# Patient Record
Sex: MALE | Race: WHITE | NOT HISPANIC OR LATINO | Employment: OTHER | URBAN - METROPOLITAN AREA
[De-identification: names, ages, dates, MRNs, and addresses within clinical notes are randomized per-mention and may not be internally consistent; named-entity substitution may affect disease eponyms.]

---

## 2018-10-19 ENCOUNTER — HOSPITAL ENCOUNTER (OUTPATIENT)
Facility: HOSPITAL | Age: 68
Setting detail: OBSERVATION
Discharge: HOME | End: 2018-10-20
Attending: EMERGENCY MEDICINE | Admitting: HOSPITALIST

## 2018-10-19 ENCOUNTER — APPOINTMENT (OUTPATIENT)
Dept: CARDIOLOGY | Facility: HOSPITAL | Age: 68
End: 2018-10-19
Attending: EMERGENCY MEDICINE

## 2018-10-19 ENCOUNTER — APPOINTMENT (OUTPATIENT)
Dept: MRI IMAGING | Facility: HOSPITAL | Age: 68
End: 2018-10-19
Attending: EMERGENCY MEDICINE

## 2018-10-19 DIAGNOSIS — I10 ELEVATED BLOOD PRESSURE READING WITH DIAGNOSIS OF HYPERTENSION: ICD-10-CM

## 2018-10-19 DIAGNOSIS — G45.4 TRANSIENT GLOBAL AMNESIA: Primary | ICD-10-CM

## 2018-10-19 PROBLEM — R42 EPISODIC LIGHTHEADEDNESS: Status: ACTIVE | Noted: 2018-10-19

## 2018-10-19 PROBLEM — R47.9 DIFFICULTY WITH SPEECH: Status: ACTIVE | Noted: 2018-10-19

## 2018-10-19 LAB
ALBUMIN SERPL-MCNC: 4.48 G/DL (ref 3.2–4.8)
ALBUMIN/GLOB SERPL: 2.2 G/DL (ref 1.5–2.5)
ALP SERPL-CCNC: 64 U/L (ref 25–100)
ALT SERPL W P-5'-P-CCNC: 28 U/L (ref 7–40)
ANION GAP SERPL CALCULATED.3IONS-SCNC: 7 MMOL/L (ref 3–11)
AST SERPL-CCNC: 25 U/L (ref 0–33)
BASOPHILS # BLD AUTO: 0.01 10*3/MM3 (ref 0–0.2)
BASOPHILS NFR BLD AUTO: 0.1 % (ref 0–1)
BH CV ECHO MEAS - AO MAX PG (FULL): 10 MMHG
BH CV ECHO MEAS - AO MAX PG: 15 MMHG
BH CV ECHO MEAS - AO MEAN PG (FULL): 5.2 MMHG
BH CV ECHO MEAS - AO MEAN PG: 7.5 MMHG
BH CV ECHO MEAS - AO ROOT AREA (BSA CORRECTED): 1.3
BH CV ECHO MEAS - AO ROOT AREA: 6.5 CM^2
BH CV ECHO MEAS - AO ROOT DIAM: 2.9 CM
BH CV ECHO MEAS - AO V2 MAX: 190.2 CM/SEC
BH CV ECHO MEAS - AO V2 MEAN: 125.9 CM/SEC
BH CV ECHO MEAS - AO V2 VTI: 40.7 CM
BH CV ECHO MEAS - AVA(I,A): 1.9 CM^2
BH CV ECHO MEAS - AVA(I,D): 1.9 CM^2
BH CV ECHO MEAS - AVA(V,A): 1.8 CM^2
BH CV ECHO MEAS - AVA(V,D): 1.8 CM^2
BH CV ECHO MEAS - BSA(HAYCOCK): 2.2 M^2
BH CV ECHO MEAS - BSA: 2.1 M^2
BH CV ECHO MEAS - BZI_BMI: 27.4 KILOGRAMS/M^2
BH CV ECHO MEAS - BZI_METRIC_HEIGHT: 182.9 CM
BH CV ECHO MEAS - BZI_METRIC_WEIGHT: 91.6 KG
BH CV ECHO MEAS - EDV(CUBED): 164 ML
BH CV ECHO MEAS - EDV(TEICH): 145.8 ML
BH CV ECHO MEAS - EF(CUBED): 86.6 %
BH CV ECHO MEAS - EF(TEICH): 79.7 %
BH CV ECHO MEAS - ESV(CUBED): 22 ML
BH CV ECHO MEAS - ESV(TEICH): 29.6 ML
BH CV ECHO MEAS - FS: 48.8 %
BH CV ECHO MEAS - IVS/LVPW: 0.8
BH CV ECHO MEAS - IVSD: 0.76 CM
BH CV ECHO MEAS - LA DIMENSION: 4.5 CM
BH CV ECHO MEAS - LA/AO: 1.6
BH CV ECHO MEAS - LAD MAJOR: 5.4 CM
BH CV ECHO MEAS - LV MASS(C)D: 173.4 GRAMS
BH CV ECHO MEAS - LV MASS(C)DI: 81 GRAMS/M^2
BH CV ECHO MEAS - LV MAX PG: 5 MMHG
BH CV ECHO MEAS - LV MEAN PG: 2.3 MMHG
BH CV ECHO MEAS - LV V1 MAX: 112 CM/SEC
BH CV ECHO MEAS - LV V1 MEAN: 66.9 CM/SEC
BH CV ECHO MEAS - LV V1 VTI: 25 CM
BH CV ECHO MEAS - LVIDD: 5.5 CM
BH CV ECHO MEAS - LVIDS: 2.8 CM
BH CV ECHO MEAS - LVOT AREA (M): 3.1 CM^2
BH CV ECHO MEAS - LVOT AREA: 3.1 CM^2
BH CV ECHO MEAS - LVOT DIAM: 2 CM
BH CV ECHO MEAS - LVPWD: 0.96 CM
BH CV ECHO MEAS - MV A MAX VEL: 88.4 CM/SEC
BH CV ECHO MEAS - MV DEC TIME: 0.28 SEC
BH CV ECHO MEAS - MV E MAX VEL: 83.4 CM/SEC
BH CV ECHO MEAS - MV E/A: 0.94
BH CV ECHO MEAS - PA ACC SLOPE: 657.2 CM/SEC^2
BH CV ECHO MEAS - PA ACC TIME: 0.13 SEC
BH CV ECHO MEAS - PA MAX PG: 5.1 MMHG
BH CV ECHO MEAS - PA PR(ACCEL): 21.2 MMHG
BH CV ECHO MEAS - PA V2 MAX: 112.9 CM/SEC
BH CV ECHO MEAS - RVDD: 2.6 CM
BH CV ECHO MEAS - SI(AO): 123.1 ML/M^2
BH CV ECHO MEAS - SI(CUBED): 66.4 ML/M^2
BH CV ECHO MEAS - SI(LVOT): 36.6 ML/M^2
BH CV ECHO MEAS - SI(TEICH): 54.3 ML/M^2
BH CV ECHO MEAS - SV(AO): 263.4 ML
BH CV ECHO MEAS - SV(CUBED): 142 ML
BH CV ECHO MEAS - SV(LVOT): 78.2 ML
BH CV ECHO MEAS - SV(TEICH): 116.2 ML
BH CV XLRA - RV BASE: 3.1 CM
BH CV XLRA - RV LENGTH: 6.5 CM
BH CV XLRA - RV MID: 2.7 CM
BH CV XLRA MEAS LEFT CCA RATIO VEL: 84.5 CM/SEC
BH CV XLRA MEAS LEFT DIST CCA EDV: 19.6 CM/SEC
BH CV XLRA MEAS LEFT DIST CCA PSV: 85.4 CM/SEC
BH CV XLRA MEAS LEFT DIST ICA EDV: 22.6 CM/SEC
BH CV XLRA MEAS LEFT DIST ICA PSV: 78.6 CM/SEC
BH CV XLRA MEAS LEFT ICA RATIO VEL: 86.4 CM/SEC
BH CV XLRA MEAS LEFT ICA/CCA RATIO: 1
BH CV XLRA MEAS LEFT MID CCA EDV: 19.6 CM/SEC
BH CV XLRA MEAS LEFT MID CCA PSV: 99.2 CM/SEC
BH CV XLRA MEAS LEFT MID ICA EDV: 17.7 CM/SEC
BH CV XLRA MEAS LEFT MID ICA PSV: 61.9 CM/SEC
BH CV XLRA MEAS LEFT PROX CCA EDV: 20.6 CM/SEC
BH CV XLRA MEAS LEFT PROX CCA PSV: 126.7 CM/SEC
BH CV XLRA MEAS LEFT PROX ECA EDV: 12.8 CM/SEC
BH CV XLRA MEAS LEFT PROX ECA PSV: 113.9 CM/SEC
BH CV XLRA MEAS LEFT PROX ICA EDV: 20.6 CM/SEC
BH CV XLRA MEAS LEFT PROX ICA PSV: 87.4 CM/SEC
BH CV XLRA MEAS LEFT PROX SCLA PSV: 129.6 CM/SEC
BH CV XLRA MEAS LEFT VERTEBRAL A EDV: 17.7 CM/SEC
BH CV XLRA MEAS LEFT VERTEBRAL A PSV: 73.7 CM/SEC
BH CV XLRA MEAS RIGHT CCA RATIO VEL: 81.5 CM/SEC
BH CV XLRA MEAS RIGHT DIST CCA EDV: 18.7 CM/SEC
BH CV XLRA MEAS RIGHT DIST CCA PSV: 82.5 CM/SEC
BH CV XLRA MEAS RIGHT DIST ICA EDV: 15.7 CM/SEC
BH CV XLRA MEAS RIGHT DIST ICA PSV: 73.7 CM/SEC
BH CV XLRA MEAS RIGHT ICA RATIO VEL: 79.6 CM/SEC
BH CV XLRA MEAS RIGHT ICA/CCA RATIO: 0.98
BH CV XLRA MEAS RIGHT MID CCA EDV: 12.8 CM/SEC
BH CV XLRA MEAS RIGHT MID CCA PSV: 83.5 CM/SEC
BH CV XLRA MEAS RIGHT MID ICA EDV: 23.6 CM/SEC
BH CV XLRA MEAS RIGHT MID ICA PSV: 80.5 CM/SEC
BH CV XLRA MEAS RIGHT PROX CCA EDV: 15.7 CM/SEC
BH CV XLRA MEAS RIGHT PROX CCA PSV: 103.1 CM/SEC
BH CV XLRA MEAS RIGHT PROX ECA EDV: 11.8 CM/SEC
BH CV XLRA MEAS RIGHT PROX ECA PSV: 70.7 CM/SEC
BH CV XLRA MEAS RIGHT PROX ICA EDV: 15.7 CM/SEC
BH CV XLRA MEAS RIGHT PROX ICA PSV: 80.5 CM/SEC
BH CV XLRA MEAS RIGHT PROX SCLA PSV: 155.2 CM/SEC
BH CV XLRA MEAS RIGHT VERTEBRAL A EDV: 14.7 CM/SEC
BH CV XLRA MEAS RIGHT VERTEBRAL A PSV: 81.5 CM/SEC
BILIRUB SERPL-MCNC: 0.5 MG/DL (ref 0.3–1.2)
BUN BLD-MCNC: 15 MG/DL (ref 9–23)
BUN/CREAT SERPL: 17.6 (ref 7–25)
CALCIUM SPEC-SCNC: 9.5 MG/DL (ref 8.7–10.4)
CHLORIDE SERPL-SCNC: 99 MMOL/L (ref 99–109)
CO2 SERPL-SCNC: 29 MMOL/L (ref 20–31)
CREAT BLD-MCNC: 0.85 MG/DL (ref 0.6–1.3)
DEPRECATED RDW RBC AUTO: 45.5 FL (ref 37–54)
EOSINOPHIL # BLD AUTO: 0 10*3/MM3 (ref 0–0.3)
EOSINOPHIL NFR BLD AUTO: 0 % (ref 0–3)
ERYTHROCYTE [DISTWIDTH] IN BLOOD BY AUTOMATED COUNT: 13.8 % (ref 11.3–14.5)
FOLATE SERPL-MCNC: 13.55 NG/ML (ref 3.2–20)
GFR SERPL CREATININE-BSD FRML MDRD: 90 ML/MIN/1.73
GLOBULIN UR ELPH-MCNC: 2 GM/DL
GLUCOSE BLD-MCNC: 113 MG/DL (ref 70–100)
HBA1C MFR BLD: 5.9 % (ref 4.8–5.6)
HCT VFR BLD AUTO: 47.7 % (ref 38.9–50.9)
HGB BLD-MCNC: 16 G/DL (ref 13.1–17.5)
HOLD SPECIMEN: NORMAL
HOLD SPECIMEN: NORMAL
IMM GRANULOCYTES # BLD: 0.02 10*3/MM3 (ref 0–0.03)
IMM GRANULOCYTES NFR BLD: 0.2 % (ref 0–0.6)
LYMPHOCYTES # BLD AUTO: 1.06 10*3/MM3 (ref 0.6–4.8)
LYMPHOCYTES NFR BLD AUTO: 11.8 % (ref 24–44)
MAXIMAL PREDICTED HEART RATE: 153 BPM
MCH RBC QN AUTO: 30.4 PG (ref 27–31)
MCHC RBC AUTO-ENTMCNC: 33.5 G/DL (ref 32–36)
MCV RBC AUTO: 90.5 FL (ref 80–99)
MONOCYTES # BLD AUTO: 0.47 10*3/MM3 (ref 0–1)
MONOCYTES NFR BLD AUTO: 5.3 % (ref 0–12)
NEUTROPHILS # BLD AUTO: 7.41 10*3/MM3 (ref 1.5–8.3)
NEUTROPHILS NFR BLD AUTO: 82.8 % (ref 41–71)
PLATELET # BLD AUTO: 266 10*3/MM3 (ref 150–450)
PMV BLD AUTO: 10.3 FL (ref 6–12)
POTASSIUM BLD-SCNC: 4.9 MMOL/L (ref 3.5–5.5)
PROT SERPL-MCNC: 6.5 G/DL (ref 5.7–8.2)
RBC # BLD AUTO: 5.27 10*6/MM3 (ref 4.2–5.76)
SODIUM BLD-SCNC: 135 MMOL/L (ref 132–146)
STRESS TARGET HR: 130 BPM
TROPONIN I SERPL-MCNC: 0 NG/ML (ref 0–0.07)
TSH SERPL DL<=0.05 MIU/L-ACNC: 1.95 MIU/ML (ref 0.35–5.35)
VIT B12 BLD-MCNC: 337 PG/ML (ref 211–911)
WBC NRBC COR # BLD: 8.95 10*3/MM3 (ref 3.5–10.8)
WHOLE BLOOD HOLD SPECIMEN: NORMAL

## 2018-10-19 PROCEDURE — G0378 HOSPITAL OBSERVATION PER HR: HCPCS

## 2018-10-19 PROCEDURE — 84484 ASSAY OF TROPONIN QUANT: CPT

## 2018-10-19 PROCEDURE — 70551 MRI BRAIN STEM W/O DYE: CPT

## 2018-10-19 PROCEDURE — 93306 TTE W/DOPPLER COMPLETE: CPT

## 2018-10-19 PROCEDURE — 93880 EXTRACRANIAL BILAT STUDY: CPT

## 2018-10-19 PROCEDURE — 99220 PR INITIAL OBSERVATION CARE/DAY 70 MINUTES: CPT | Performed by: HOSPITALIST

## 2018-10-19 PROCEDURE — 83036 HEMOGLOBIN GLYCOSYLATED A1C: CPT | Performed by: HOSPITALIST

## 2018-10-19 PROCEDURE — 99285 EMERGENCY DEPT VISIT HI MDM: CPT

## 2018-10-19 PROCEDURE — 25010000002 THIAMINE PER 100 MG: Performed by: HOSPITALIST

## 2018-10-19 PROCEDURE — 93005 ELECTROCARDIOGRAM TRACING: CPT | Performed by: EMERGENCY MEDICINE

## 2018-10-19 PROCEDURE — 86592 SYPHILIS TEST NON-TREP QUAL: CPT | Performed by: HOSPITALIST

## 2018-10-19 PROCEDURE — 82607 VITAMIN B-12: CPT | Performed by: HOSPITALIST

## 2018-10-19 PROCEDURE — 84443 ASSAY THYROID STIM HORMONE: CPT | Performed by: HOSPITALIST

## 2018-10-19 PROCEDURE — 96375 TX/PRO/DX INJ NEW DRUG ADDON: CPT

## 2018-10-19 PROCEDURE — 80053 COMPREHEN METABOLIC PANEL: CPT | Performed by: EMERGENCY MEDICINE

## 2018-10-19 PROCEDURE — 85025 COMPLETE CBC W/AUTO DIFF WBC: CPT | Performed by: EMERGENCY MEDICINE

## 2018-10-19 PROCEDURE — 96365 THER/PROPH/DIAG IV INF INIT: CPT

## 2018-10-19 PROCEDURE — 82746 ASSAY OF FOLIC ACID SERUM: CPT | Performed by: HOSPITALIST

## 2018-10-19 RX ORDER — ACETAMINOPHEN 325 MG/1
650 TABLET ORAL EVERY 4 HOURS PRN
Status: DISCONTINUED | OUTPATIENT
Start: 2018-10-19 | End: 2018-10-20 | Stop reason: HOSPADM

## 2018-10-19 RX ORDER — SODIUM CHLORIDE 0.9 % (FLUSH) 0.9 %
10 SYRINGE (ML) INJECTION AS NEEDED
Status: DISCONTINUED | OUTPATIENT
Start: 2018-10-19 | End: 2018-10-20 | Stop reason: HOSPADM

## 2018-10-19 RX ORDER — SODIUM CHLORIDE 0.9 % (FLUSH) 0.9 %
3 SYRINGE (ML) INJECTION EVERY 12 HOURS SCHEDULED
Status: DISCONTINUED | OUTPATIENT
Start: 2018-10-19 | End: 2018-10-20 | Stop reason: HOSPADM

## 2018-10-19 RX ORDER — ATORVASTATIN CALCIUM 40 MG/1
40 TABLET, FILM COATED ORAL NIGHTLY
Status: DISCONTINUED | OUTPATIENT
Start: 2018-10-19 | End: 2018-10-20 | Stop reason: HOSPADM

## 2018-10-19 RX ORDER — ASPIRIN 325 MG
325 TABLET ORAL DAILY
COMMUNITY
End: 2018-10-20 | Stop reason: HOSPADM

## 2018-10-19 RX ORDER — ASPIRIN 81 MG/1
81 TABLET ORAL DAILY
Status: DISCONTINUED | OUTPATIENT
Start: 2018-10-20 | End: 2018-10-20 | Stop reason: HOSPADM

## 2018-10-19 RX ORDER — LISINOPRIL 10 MG/1
10 TABLET ORAL ONCE
Status: COMPLETED | OUTPATIENT
Start: 2018-10-19 | End: 2018-10-19

## 2018-10-19 RX ORDER — LABETALOL HYDROCHLORIDE 5 MG/ML
10 INJECTION, SOLUTION INTRAVENOUS EVERY 4 HOURS PRN
Status: DISCONTINUED | OUTPATIENT
Start: 2018-10-19 | End: 2018-10-20 | Stop reason: HOSPADM

## 2018-10-19 RX ORDER — ASPIRIN 325 MG
325 TABLET ORAL ONCE
Status: COMPLETED | OUTPATIENT
Start: 2018-10-19 | End: 2018-10-19

## 2018-10-19 RX ORDER — LISINOPRIL 10 MG/1
10 TABLET ORAL
Status: DISCONTINUED | OUTPATIENT
Start: 2018-10-20 | End: 2018-10-20 | Stop reason: HOSPADM

## 2018-10-19 RX ORDER — SODIUM CHLORIDE 0.9 % (FLUSH) 0.9 %
3-10 SYRINGE (ML) INJECTION AS NEEDED
Status: DISCONTINUED | OUTPATIENT
Start: 2018-10-19 | End: 2018-10-20 | Stop reason: HOSPADM

## 2018-10-19 RX ADMIN — LABETALOL HYDROCHLORIDE 10 MG: 5 INJECTION INTRAVENOUS at 18:44

## 2018-10-19 RX ADMIN — ACETAMINOPHEN 650 MG: 325 TABLET ORAL at 20:32

## 2018-10-19 RX ADMIN — ASPIRIN 325 MG ORAL TABLET 325 MG: 325 PILL ORAL at 14:26

## 2018-10-19 RX ADMIN — ATORVASTATIN CALCIUM 40 MG: 40 TABLET, FILM COATED ORAL at 20:32

## 2018-10-19 RX ADMIN — Medication 3 ML: at 20:32

## 2018-10-19 RX ADMIN — LISINOPRIL 10 MG: 10 TABLET ORAL at 16:26

## 2018-10-19 RX ADMIN — THIAMINE HYDROCHLORIDE 100 MG: 100 INJECTION, SOLUTION INTRAMUSCULAR; INTRAVENOUS at 18:29

## 2018-10-19 NOTE — ED PROVIDER NOTES
Subjective   Lee Hedrick is a 68 y.o.male who presents to the ED with complaints of memory loss. Mr. Hedrick states that he has had some difficulty recalling information from yesterday. He is accompanied by his  who says that he called him this morning and noticed that he could not remember being in homes yesterday he had appointments at. He was also confused while on the phone. His friend says that he is typically very sharp. Mr. Hedrick states that he entered things into his calendar yesterday and could not remember anything about them today. His  also states that he noticed he was shuffling and walking oddly today. Mr. eHdrick denies having any cough, cold, shortness of breath, chest pain, numbness, or difficulty with balance or eating.  He adds that he has had HTN for his entire life since he was a child. He does not take any medications daily. He occasionally will take an aspirin when he feels bad. He has a history of testicular cancer that developed into non-Hodgkin's lymphoma in the 80's. His  also adds that he drinks 2-4 glasses of wine a day and smokes marijuana on occasion. There are no other acute complaints at this time.                   History provided by:  Patient  Neurologic Problem   The patient's primary symptoms include memory loss. This is a new problem. The current episode started today. The neurological problem developed gradually. The last time the patient was known to be well was 10/18/2018 2:00 PM.  The problem is unchanged. There was no focality noted. Associated symptoms include confusion. Pertinent negatives include no chest pain or shortness of breath. Past treatments include nothing.       Review of Systems   Respiratory: Negative for cough and shortness of breath.    Cardiovascular: Negative for chest pain.   Musculoskeletal: Positive for gait problem.   Neurological: Negative for numbness.        Memory Loss.    Psychiatric/Behavioral: Positive for confusion and memory loss.   All other systems reviewed and are negative.      Past Medical History:   Diagnosis Date   • Hypertension    • Non-Hodgkin lymphoma (CMS/HCC)    • Testicular cancer (CMS/HCC)        Allergies   Allergen Reactions   • Penicillins Rash   • Sulfa Antibiotics Rash       Past Surgical History:   Procedure Laterality Date   • EXPLORATORY LAPAROTOMY     • KNEE SURGERY     • ORCHIECTOMY     • TONSILLECTOMY         Family History   Problem Relation Age of Onset   • No Known Problems Mother    • No Known Problems Father    • No Known Problems Sister        Social History     Social History   • Marital status: Single     Social History Main Topics   • Smoking status: Never Smoker   • Smokeless tobacco: Never Used   • Alcohol use Yes      Comment: 1-2 glasses of wine daily, last 1 week ago   • Drug use: Unknown     Types: Marijuana      Comment: occasionally      Other Topics Concern   • Not on file     Social History Narrative    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          Objective   Physical Exam   Constitutional: He is oriented to person, place, and time. He appears well-developed and well-nourished. No distress.   HENT:   Head: Normocephalic and atraumatic.   Nose: Nose normal.   Eyes: Conjunctivae are normal. No scleral icterus.   Neck: Normal range of motion. Neck supple.   Cardiovascular: Normal rate, regular rhythm and normal heart sounds.    No murmur heard.  Pulmonary/Chest: Effort normal and breath sounds normal. No respiratory distress.   Abdominal: Soft. Bowel sounds are normal. There is no tenderness.   Musculoskeletal: Normal range of motion. He exhibits no edema.   Neurological: He is alert and oriented to person, place, and time.   Skin: Skin is warm and dry.   Psychiatric: He has a normal mood and affect. His behavior is normal.   Nursing note and vitals reviewed.      Procedures         ED Course  ED Course as of Oct 20 1158   Fri Oct 19,  2018 1408 I paged Dr. Marquez.  Pressure is now 164/106.  I think he likely has had acute CVA although may end up being TIA.  We will obtain MRI of his brain to further evaluate.  [DT]   1411 Discussed the patient's case with Dr. Marquez. He recommended an MRI, Carotid doppler, and echo.   [TJ]   1412 Paged the hospitalist.   [TJ]   1429 Discussed Mr. Miguel's case with the hospitalist who will admit.   [TJ]   1430 Discussed with Dr. hernandez who will admit  [DT]   1515 I have reviewed labs and they are unremarkable will notify Dr. hernandez  [DT]   1614 Discussed the patient's case with Dr. Marquez, Neurologist.   [TJ]   1615 I updated Mr. Perez again and his family about negative MRI findings and plan for admission.  [DT]   1622 Revisited Mr. Miguel to update him on the results of the MRI and plan for admission.   [TJ]      ED Course User Index  [DT] Mike Montalvo MD  [TJ] Sonido Cox                     MDM  Number of Diagnoses or Management Options  Elevated blood pressure reading with diagnosis of hypertension: established and worsening  Transient global amnesia: new and requires workup     Amount and/or Complexity of Data Reviewed  Clinical lab tests: ordered and reviewed  Tests in the radiology section of CPT®: ordered and reviewed  Obtain history from someone other than the patient: yes  Discuss the patient with other providers: yes  Independent visualization of images, tracings, or specimens: yes    Patient Progress  Patient progress: improved      Final diagnoses:   Transient global amnesia   Elevated blood pressure reading with diagnosis of hypertension       Documentation assistance provided by jerrica Cox.  Information recorded by the jerrica was done at my direction and has been verified and validated by me.     Sonido Cox  10/19/18 1417       Sonido Cox  10/19/18 1518       Mike Montalvo MD  10/20/18 [id number]

## 2018-10-19 NOTE — H&P
[facility] Medicine Services  HISTORY AND PHYSICAL    Patient Name: Jadon Trujillo III  : 1950  MRN: [id number]  Primary Care Physician: Provider, No Known None  Date of admission: 10/19/2018      Subjective   Subjective     Chief Complaint:  forgetfulness    HPI:  Jadon Trujillo III is a 68 y.o. male with a remote hx of testicular cancer and Non-Hodgkin's Lymphoma, on no prescription meds and with limited medical follow-up, who was brought to the [facility] ER today by his friend for evaluation of the acute onset of memory impairment. 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 When his associate called this morning, the patient had slept in until 10AM which is unusual and the pt could not recall those showings, nor the properties, and constantly asked questions, rehashing the same piece of information. He also asked about a property that had been closed in July (but he had not marked as sold on his computer). Patient's associate and his significant other came to his house and found him confused and with an episode of transient nonsensical speech as if he could not voice his thoughts. The house looked normal and pt had no injuries, abrasions, and appeared in his typical well-groomed state.  At time of my evaluation, pt denies headache (told his associate he had one this AM), double vision, vision impairment, difficulties speaking or swallowing, or focal numbness/tingling/weakness. He reports transient episodes of lightheadedness without syncope. No recent injury. No recent social or physical stress. Pt does take a 325mg Aspirin on occasion, last several days ago. He also smokes marijuana on occasion, but last time was last week. Has not had alcohol for a week and does not drink daily.    Patient has no prior hx of cognitive dysfunction but his associate does report occasional forgetfulness. Today, the pt found a month old check  that he had not cashed which is unusual for him.     Pt reports being told that he has high blood pressure for a long time but not taking medication for it.     Pt's ROS is otherwise negative. He does note a mild cough and wheezing as well as ear congestion. No fevers, chills, night sweats.      Review of Systems   Otherwise 10-system ROS reviewed and is negative except as mentioned in the HPI.    Personal History     Past Medical History:   Diagnosis Date   • Hypertension    • Non-Hodgkin lymphoma (CMS/HCC)    • Testicular cancer (CMS/HCC)        Past Surgical History:   Procedure Laterality Date   • EXPLORATORY LAPAROTOMY     • KNEE SURGERY     • ORCHIECTOMY     • TONSILLECTOMY         Family History: family history includes No Known Problems in his father, mother, and sister.     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        Medications:  Available home medication information reviewed. Takes a 325 mg aspirin on occasion.     Allergies   Allergen Reactions   • Penicillins Rash   • Sulfa Antibiotics Rash       Objective   Objective     Vital Signs:   Temp:  [98.6 °F (37 °C)] 98.6 °F (37 °C)  Heart Rate:  [67-73] 73  Resp:  [18] 18  BP: (164-195)/(106-107) 164/106      Physical Exam   Constitutional: No acute distress, awake, alert  Eyes: PERRLA, sclerae anicteric, no conjunctival injection  HENT: NCAT, mucous membranes moist  Neck: Supple, no thyromegaly, no lymphadenopathy, trachea midline  Respiratory: Clear to auscultation bilaterally, nonlabored respirations   Cardiovascular: RRR, no murmurs, rubs, or gallops, palpable pedal pulses bilaterally  Gastrointestinal: Positive bowel sounds, soft, nontender, nondistended  Musculoskeletal: No bilateral ankle edema, no clubbing or cyanosis to extremities  Psychiatric: Appropriate affect, cooperative  Neurologic: Oriented x 3, strength symmetric in all extremities, Cranial Nerves grossly intact to  confrontation, speech clear, no pronator drift, finger to nose intact, DTRs symmetric in all extremities, toes downgoing, rapid alternating movements intact. Did demonstrate anterograde amnesia during my visit (we discussed blood work results twice)  Skin: No rashes    Results Reviewed:  I have personally reviewed current lab, radiology, and data and agree.      Results from last 7 days  Lab Units 10/19/18  1349   WBC 10*3/mm3 8.95   HEMOGLOBIN g/dL 16.0   HEMATOCRIT % 47.7   PLATELETS 10*3/mm3 266       Results from last 7 days  Lab Units 10/19/18  1417   SODIUM mmol/L 135   POTASSIUM mmol/L 4.9   CHLORIDE mmol/L 99   CO2 mmol/L 29.0   BUN mg/dL 15   CREATININE mg/dL 0.85   GLUCOSE mg/dL 113*   CALCIUM mg/dL 9.5   ALT (SGPT) U/L 28   AST (SGOT) U/L 25     Estimated Creatinine Clearance: 109.5 mL/min (by C-G formula based on SCr of 0.85 mg/dL).  Brief Urine Lab Results     None        No results found for: BNP  Imaging Results (last 24 hours)     Procedure Component Value Units Date/Time    MRI Brain Without Contrast [[id number]] Collected:  10/19/18 1534     Updated:  10/19/18 1548    Narrative:       EXAMINATION: MRI BRAIN WO CONTRAST- 10/19/2018     INDICATION: Confusion/delirium, altered LOC, unexplained;  G45.4-Transient global amnesia; I10-Essential (primary) hypertension      TECHNIQUE: Multiplanar MRI of the brain without intravenous contrast  administration     COMPARISON: NONE     FINDINGS: No restriction on diffusion-weighted imaging. Midline  structures are symmetric without evidence of mass, mass effect or  midline shift. Ventricles and sulci within normal limits for patient  age. No hydrocephalus.  Mild amount of T2 and FLAIR increased signal  abnormality within the periventricular and deep white matter which may  represent early chronic small vessel ischemic disease. Pituitary and  sella within normal limits. Cervicomedullary junction widely patent.  Globes and orbits retain normal T2 signal  characteristics. Visualized  paranasal sinuses and mastoid air cells are grossly clear and  well-pneumatized. No cerebellopontine angle mass lesion. Normal signal  flow voids in the distal internal carotid and basilar arteries.       Impression:       No acute intracranial abnormality. Specifically no evidence  for acute infarction. No focal atrophy.     D:  10/19/2018  E:  10/19/2018                    Assessment/Plan   Assessment / Plan     Jadon Trujillo III is a 68 y.o. male with a remote hx of testicular cancer and Non-Hodgkin's Lymphoma, on no prescription meds and with limited medical follow-up, who is admitted with acute amnesia, presumably transient global amnesia.     Active Hospital Problems    Diagnosis   • **Transient global amnesia   • Hypertension   • Episodic lightheadedness   • Difficulty with speech     Assessment & Plan:    Probable Transient Global Amnesia, pt with both retrograde and anterograde amnesia of acute onset  Episode of Expressive Aphasia (?), ? TIA  Uncontrolled Hypertension  Recurrent Episodes of Lightheadedness  Recreational Marijuana Use  - Admit to telemetry, observation status  - Cardiac monitor, check carotid duplex, echo, HgbA1c, lipid panel, TSH, B12/folate, RPR, Neuro checks  - IV thiamine 100mg x 1  - Neurology Consult  - MRI Brain reviewed and negative, Labs in ER normal   - ECG reviewed and normal on my interp  - Aspirin and atorvastatin (episode of what sounds like aphasia observed by friends is only symptom suggestive of possible TIA)  - Lisinopril for blood pressure, prn IV labetalol for severe hypertension  - Needs to establish care with PCP after dc   - Would probably benefit from outpatient neuropsychiatric testing due to some subacute symptoms noticed by associate (although they sound very mild)    Probably DC home midday tomorrow after testing     DVT prophylaxis: mechanical given anticipated short stay   CODE STATUS:    Code Status and Medical Interventions:    Ordered at: 10/19/18 1637     Code Status:    CPR     Medical Interventions (Level of Support Prior to Arrest):    Full     Admission Status:  I believe this patient meets OBSERVATION status, however if further evaluation or treatment plans warrant, status may change.  Based upon current information, I predict patient's care encounter to be less than or equal to 2 midnights.    Electronically signed by Caio Hooper MD, 10/19/18, 4:11 PM.

## 2018-10-20 VITALS
BODY MASS INDEX: 27.41 KG/M2 | OXYGEN SATURATION: 94 % | RESPIRATION RATE: 18 BRPM | WEIGHT: 202.38 LBS | HEART RATE: 74 BPM | DIASTOLIC BLOOD PRESSURE: 88 MMHG | SYSTOLIC BLOOD PRESSURE: 153 MMHG | TEMPERATURE: 98.7 F | HEIGHT: 72 IN

## 2018-10-20 LAB
AMMONIA BLD-SCNC: 27 UMOL/L (ref 19–60)
ARTICHOKE IGE QN: 172 MG/DL (ref 0–130)
CHOLEST SERPL-MCNC: 223 MG/DL (ref 0–200)
HDLC SERPL-MCNC: 44 MG/DL (ref 40–60)
TRIGL SERPL-MCNC: 112 MG/DL (ref 0–150)

## 2018-10-20 PROCEDURE — G0378 HOSPITAL OBSERVATION PER HR: HCPCS

## 2018-10-20 PROCEDURE — 99204 OFFICE O/P NEW MOD 45 MIN: CPT | Performed by: PSYCHIATRY & NEUROLOGY

## 2018-10-20 PROCEDURE — 80061 LIPID PANEL: CPT | Performed by: HOSPITALIST

## 2018-10-20 PROCEDURE — 82140 ASSAY OF AMMONIA: CPT | Performed by: HOSPITALIST

## 2018-10-20 PROCEDURE — 99217 PR OBSERVATION CARE DISCHARGE MANAGEMENT: CPT | Performed by: HOSPITALIST

## 2018-10-20 RX ORDER — LISINOPRIL 10 MG/1
10 TABLET ORAL
Qty: 30 TABLET | Refills: 0 | Status: SHIPPED | OUTPATIENT
Start: 2018-10-21 | End: 2018-11-20

## 2018-10-20 RX ORDER — ATORVASTATIN CALCIUM 40 MG/1
40 TABLET, FILM COATED ORAL NIGHTLY
Qty: 30 TABLET | Refills: 0 | Status: SHIPPED | OUTPATIENT
Start: 2018-10-20 | End: 2018-11-19

## 2018-10-20 RX ORDER — ASPIRIN 81 MG/1
81 TABLET ORAL DAILY
Qty: 30 TABLET | Refills: 0 | Status: SHIPPED | OUTPATIENT
Start: 2018-10-21 | End: 2018-11-20

## 2018-10-20 RX ADMIN — LISINOPRIL 10 MG: 10 TABLET ORAL at 09:14

## 2018-10-20 RX ADMIN — Medication 3 ML: at 10:26

## 2018-10-20 RX ADMIN — ASPIRIN 81 MG: 81 TABLET, COATED ORAL at 09:14

## 2018-10-20 NOTE — CONSULTS
Neurology    Referring Provider: Dr. Hooper    Reason for Consultation: confusion      Chief complaint: confusion    Subjective .     History of present illness:  Mr. Trujillo is a pleasant and verbose 68-year-old male with past medical history significant for testicular cancer non-Hodgkin's lymphoma as well as hypertension who is admitted to the hospitalist service for an acute episode of confusion and forgetfulness.  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  He had difficulty retaining memories and was asking the same questions over and over.  No slurred speech or extremity weakness per report.  There was a report of some transient nonsensical speech.  Symptoms have resolved by this morning.  Of note he has not been on prescription medications for his hypertension and states that in the past he has preferred holistic treatments over medications.  He does report taking aspirin inconsistently.    Review of Systems: Positive for confusion and forgetfulness.Otherwise complete review of systems was discussed with the patient and found to be negative except for that mentioned in history of present illness.    History  Past Medical History:   Diagnosis Date   • Hypertension    • Non-Hodgkin lymphoma (CMS/HCC)    • Testicular cancer (CMS/HCC)    ,   Past Surgical History:   Procedure Laterality Date   • EXPLORATORY LAPAROTOMY     • KNEE SURGERY     • ORCHIECTOMY     • TONSILLECTOMY     ,   Family History   Problem Relation Age of Onset   • No Known Problems Mother    • No Known Problems Father    • No Known Problems Sister    ,   Social History   Substance Use Topics   • Smoking status: Never Smoker   • Smokeless tobacco: Never Used   • Alcohol use Yes      Comment: 1-2 glasses of wine daily, last 1 week ago   ,   Prescriptions Prior to Admission   Medication Sig Dispense Refill Last Dose  "  • aspirin 325 MG tablet Take 325 mg by mouth Daily.   10/18/2018 at Unknown time    and Allergies:  Penicillins and Sulfa antibiotics    Objective     Vital Signs   Blood pressure 153/88, pulse 74, temperature 98.7 °F (37.1 °C), temperature source Oral, resp. rate 18, height 182.9 cm (72\"), weight 91.8 kg (202 lb 6.1 oz), SpO2 94 %.    Physical Exam:      Gen: Lying in bed with eyes open. In NAD. Appears stated age   Eyes: PERRL, conjuntivae/lids normal   ENT: External canals normal bilaterally. Oropharynx normal.    Neck: Supple. No thyroid enlargement noted   Respiratory: CTA bilaterally. Respirations unlabored   CV: RRR, S1 and S2 nml. Radial pulses 2+ bilaterally.    Skin: No rashes noted on exposed skin. Normal turgor.   MSK: Normal bulk and tone. Nml ROM     Neurologic:   Mental status: Awake, alert, Follows commands. Speech fluent.    CN: PERRL, EOM intact, sensation intact in upper/mid/lower face bilaterally, facial movements symmetric, hearing intact to finger rub bilaterally, palate elevates symmetrically, tongue movements and SCMs strong bilaterally    Motor: Strength full (5/5) throughout in BUE and BLE    Reflexes: 2+ throughout.    Sensory: Intact to LT throughout   Coordination: No dysmetria noted   Gait: Not tested        Results Reviewed:     Labs reviewed.  A1c 5.9,   MRI brain personally reviewed.  No acute process seen  EKG report reviewed    TTE and carotid duplex reports reviewed               Assessment/Plan     1.  Transient global amnesia = likely etiology of his symptoms.  Notably does have some vascular risk factors including hyperlipidemia and hypertension.  Recommend good BP control.  Agree with aspirin and atorvastatin.  MRI brain negative for signs of acute infarct.  Carotid duplex report unremarkable.    Okay from neuro standpoint for discharge when okay with primary team.  Follow-up with PCP      Felicia Marquez MD  10/20/18  2:16 PM            "

## 2018-10-20 NOTE — PROGRESS NOTES
Continued Stay Note  [facility]     Patient Name: Jadon Trujillo III  MRN: [id number]  Today's Date: 10/20/2018    Admit Date: 10/19/2018          Discharge Plan     Row Name 10/20/18 1455       Plan    Plan Comments CM emailed Betty Gonzales to arrange new PCP f/u appt and contact pt with info following hospital DC.    Final Discharge Disposition Code 01 - home or self-care              Discharge Codes    No documentation.           Shirlene Ball

## 2018-10-20 NOTE — DISCHARGE SUMMARY
[facility] Medicine Services  DISCHARGE SUMMARY    Patient Name: Jadon Trujillo III  : 1950  MRN: [id number]    Date of Admission: 10/19/2018  Date of Discharge:  10/20/2018 (Saturday)  Primary Care Physician: Provider, No Known - Has no Primary Care Doctor    Consults     Date and Time Order Name Status Description    10/19/2018 1645 Inpatient Neurology Consult Completed         Felicia Marquez MD - Neurology    Hospital Course     Presenting Problem:   Transient global amnesia [G45.4]    Active Hospital Problems    Diagnosis Date Noted   • **Transient global amnesia [G45.4] 10/19/2018   • Hypertension [I10] 10/19/2018   • Episodic lightheadedness [R42] 10/19/2018   • Difficulty with speech [R47.9] 10/19/2018      Resolved Hospital Problems    Diagnosis Date Noted Date Resolved   No resolved problems to display.     forgetfulness  Severe hypertension 195/107  Possible Pre-diabetes    Hospital Course:  Jadon Trujillo III is a 68 y.o. male who has no primary care and has been on no medications who was brought in by friends /  for making no sense over the telephone.   He says he has known high blood pressure for years but has been on no medication up to this point.  He says his mother was a Ph.D in nursing and she helped him with making medical decisions and he wants to avoid the flu shot and being on too many mediations.  He had an MRI on admission which may show some small vessel disease, but no focal abnormalities.      He was seen by Neurology and thought to have transient global amnesia and recommended BP control and risk modifying medications aspirin and atorvastatin.      I advised him to check his blood pressure at home as his blood pressure will likely be still elevated and will need long term titration and he needs to follow up with a provider to monitor for medication side effects such as renal issues or potassium issues and may need further testing  for his brain.      He seems to be very particular with regards to medical advice and his mental status may be effecting decision making but he feels good for discharge and promises to develop a primary relationship with a provider to long term medical health and wellness.    Day of Discharge     HPI:  Was hoping to go home.  Says his blood pressure has been high for years.  He is on no current medication and has no PCP.      Review of Systems    Gen- No fevers, chills  CV- No chest pain, palpitations  Resp- No cough, dyspnea  GI- No N/V/D, abd pain    Otherwise ROS is negative except as mentioned in the HPI.    Vital Signs:   Temp:  [97.9 °F (36.6 °C)-99.3 °F (37.4 °C)] 98.7 °F (37.1 °C)  Heart Rate:  [53-74] 74  Resp:  [16-18] 18  BP: (126-188)/(67-95) 153/88     Physical Exam:    Constitutional: No acute distress, awake, alert  HENT: NCAT, mucous membranes moist  Respiratory: Clear to auscultation bilaterally, respiratory effort normal   Cardiovascular: RRR, s1 and s2  Gastrointestinal: Positive bowel sounds, soft, nontender, nondistended  Musculoskeletal: No bilateral ankle edema  Psychiatric: Appropriate affect, cooperative  Neurologic: Oriented x 3, strength symmetric in all extremities, Cranial Nerves grossly intact to confrontation, speech clear  Skin: No rashes      Pertinent  and/or Most Recent Results       Results from last 7 days  Lab Units 10/19/18  1417 10/19/18  1349   WBC 10*3/mm3  --  8.95   HEMOGLOBIN g/dL  --  16.0   HEMATOCRIT %  --  47.7   PLATELETS 10*3/mm3  --  266   SODIUM mmol/L 135  --    POTASSIUM mmol/L 4.9  --    CHLORIDE mmol/L 99  --    CO2 mmol/L 29.0  --    BUN mg/dL 15  --    CREATININE mg/dL 0.85  --    GLUCOSE mg/dL 113*  --    CALCIUM mg/dL 9.5  --        Results from last 7 days  Lab Units 10/19/18  1417   BILIRUBIN mg/dL 0.5   ALK PHOS U/L 64   ALT (SGPT) U/L 28   AST (SGOT) U/L 25       Results from last 7 days  Lab Units 10/20/18  0554   CHOLESTEROL mg/dL 223*    TRIGLYCERIDES mg/dL 112   HDL CHOL mg/dL 44       Results from last 7 days  Lab Units 10/19/18  1417 10/19/18  1349   TSH mIU/mL 1.949  --    HEMOGLOBIN A1C %  --  5.90*     Brief Urine Lab Results     None          Microbiology Results Abnormal     None          Imaging Results (all)     Procedure Component Value Units Date/Time    MRI Brain Without Contrast [[id number]] Collected:  10/19/18 1534     Updated:  10/19/18 1640    Narrative:       EXAMINATION: MRI BRAIN WO CONTRAST- 10/19/2018     INDICATION: Confusion/delirium, altered LOC, unexplained;  G45.4-Transient global amnesia; I10-Essential (primary) hypertension      TECHNIQUE: Multiplanar MRI of the brain without intravenous contrast  administration     COMPARISON: NONE     FINDINGS: No restriction on diffusion-weighted imaging. Midline  structures are symmetric without evidence of mass, mass effect or  midline shift. Ventricles and sulci within normal limits for patient  age. No hydrocephalus.  Mild amount of T2 and FLAIR increased signal  abnormality within the periventricular and deep white matter which may  represent early chronic small vessel ischemic disease. Pituitary and  sella within normal limits. Cervicomedullary junction widely patent.  Globes and orbits retain normal T2 signal characteristics. Visualized  paranasal sinuses and mastoid air cells are grossly clear and  well-pneumatized. No cerebellopontine angle mass lesion. Normal signal  flow voids in the distal internal carotid and basilar arteries.       Impression:       No acute intracranial abnormality. Specifically no evidence  for acute infarction. No focal atrophy.     D:  10/19/2018  E:  10/19/2018         This report was finalized on 10/19/2018 4:38 PM by Dr. Qamar Carmona.             Results for orders placed during the hospital encounter of 10/19/18   Duplex Carotid Ultrasound CAR    Narrative · Right internal carotid artery stenosis of 0-49%.  · Left internal carotid artery  stenosis of 0-49%.          Results for orders placed during the hospital encounter of 10/19/18   Duplex Carotid Ultrasound CAR    Narrative · Right internal carotid artery stenosis of 0-49%.  · Left internal carotid artery stenosis of 0-49%.          Results for orders placed during the hospital encounter of 10/19/18   Adult Transthoracic Echo Complete W/ Cont if Necessary Per Protocol    Narrative · Left atrial cavity size is dilated.           Order Current Status    RPR In process        Discharge Details        Discharge Medications      New Medications      Instructions Start Date   aspirin 81 MG EC tablet  Replaces:  aspirin 325 MG tablet   81 mg, Oral, Daily      atorvastatin 40 MG tablet  Commonly known as:  LIPITOR   40 mg, Oral, Nightly      lisinopril 10 MG tablet  Commonly known as:  PRINIVIL,ZESTRIL   10 mg, Oral, Every 24 Hours Scheduled         Stop These Medications    aspirin 325 MG tablet  Replaced by:  aspirin 81 MG EC tablet          Discharge Disposition:  Home or Self Care    Discharge Diet:  Low sodium    Discharge Activity:  As tolerated    Special Instructions:  Patient says he will not take the flu vaccine.  He isn't sure who to establish care with for a PCP.    Code Status/Level of Support:  Code Status and Medical Interventions:   Ordered at: 10/19/18 1637     Code Status:    CPR     Medical Interventions (Level of Support Prior to Arrest):    Full       No future appointments.    Additional Instructions for the Follow-ups that You Need to Schedule     Discharge Follow-up with PCP    As directed      Currently Documented PCP:  Provider, No Known  PCP Phone Number:  [phone]    Follow Up Details:  Betty Gonzales RN to call patient to facilitate new PCP appt with [facility] 1-2 weeks             Betty Gonzales RN - Clinic / Practice will call patient next week to help facilitate getting him a Primary Care Physician within the [facility].   I provided her name to  patient and advised him to see a Primary Care provider within a month of discharge.      Two visits to the bedside today for extended discussion since this is my first encounter with patient and wanted to express importance of long term follow up and having someone to prescribe medications for him.    Time Spent on Discharge:  40  minutes    Electronically signed by Osiel Lazar MD, 10/20/18, 4:27 PM.

## 2018-10-22 ENCOUNTER — TELEPHONE (OUTPATIENT)
Dept: FAMILY MEDICINE CLINIC | Facility: CLINIC | Age: 68
End: 2018-10-22

## 2018-10-22 LAB — RPR SER QL: NORMAL

## 2018-10-22 NOTE — TELEPHONE ENCOUNTER
Called patient to f/up post hospital DC & to establish new PCP.  Pt reports he is already scheduled to see Dr Hector Solano MD as new pt tomorrow AM @ [facility].  Pt reports he started QD lisinopril yesterday AM & took 2nd AM dose today, took atorvastatin last night.  He has been monitoring his BPs:  BP @ 7pm 126/83. Reports one episode of light headedness @ 11pm last night before bed, BP at that time was 73/52.  He states this was not positional - he was sitting when it occurred.  He retook it an hour later and it was 114/65. This AM it was 153/90.  He has already taken his lisinopril.  I advised re hypotension safety precautions and that he should contact Dr Solano's office or [facility] should he have another episode of symptomatic low BP today.  He verb understanding.  During this conversation, pt voiced orientation x3 & responded appropriately to questions, though his speech was pressured.  He states he has a local social network of friends who are health providers and they are aware of the BP issues he is having and that he has f/up w Dr Solano in AM.

## 2018-10-23 ENCOUNTER — TRANSCRIBE ORDERS (OUTPATIENT)
Dept: ADMINISTRATIVE | Facility: HOSPITAL | Age: 68
End: 2018-10-23

## 2018-10-23 ENCOUNTER — HOSPITAL ENCOUNTER (OUTPATIENT)
Dept: GENERAL RADIOLOGY | Facility: HOSPITAL | Age: 68
Discharge: HOME | End: 2018-10-23
Attending: INTERNAL MEDICINE | Admitting: INTERNAL MEDICINE

## 2018-10-23 DIAGNOSIS — R05.9 COUGH: Primary | ICD-10-CM

## 2018-10-23 DIAGNOSIS — G45.4 TRANSIENT GLOBAL AMNESIA: Primary | ICD-10-CM

## 2018-10-23 DIAGNOSIS — G45.9 TIA (TRANSIENT ISCHEMIC ATTACK): ICD-10-CM

## 2018-10-23 DIAGNOSIS — R05.9 COUGH: ICD-10-CM

## 2018-10-23 PROCEDURE — 71046 X-RAY EXAM CHEST 2 VIEWS: CPT

## 2018-10-26 ENCOUNTER — OFFICE VISIT (OUTPATIENT)
Dept: CARDIOLOGY | Facility: HOSPITAL | Age: 68
End: 2018-10-26

## 2018-10-26 ENCOUNTER — HOSPITAL ENCOUNTER (OUTPATIENT)
Dept: CARDIOLOGY | Facility: HOSPITAL | Age: 68
Discharge: HOME | End: 2018-10-26
Admitting: NURSE PRACTITIONER

## 2018-10-26 ENCOUNTER — HOSPITAL ENCOUNTER (OUTPATIENT)
Dept: CARDIOLOGY | Facility: HOSPITAL | Age: 68
Discharge: HOME | End: 2018-10-26

## 2018-10-26 VITALS
DIASTOLIC BLOOD PRESSURE: 74 MMHG | HEART RATE: 58 BPM | TEMPERATURE: 97.2 F | RESPIRATION RATE: 16 BRPM | SYSTOLIC BLOOD PRESSURE: 134 MMHG | BODY MASS INDEX: 27.36 KG/M2 | WEIGHT: 202 LBS | HEIGHT: 72 IN | OXYGEN SATURATION: 97 %

## 2018-10-26 DIAGNOSIS — R42 EPISODIC LIGHTHEADEDNESS: ICD-10-CM

## 2018-10-26 DIAGNOSIS — R07.89 CHEST DISCOMFORT: ICD-10-CM

## 2018-10-26 DIAGNOSIS — G45.4 TRANSIENT GLOBAL AMNESIA: Primary | ICD-10-CM

## 2018-10-26 DIAGNOSIS — I10 ESSENTIAL HYPERTENSION: ICD-10-CM

## 2018-10-26 PROCEDURE — 93010 ELECTROCARDIOGRAM REPORT: CPT | Performed by: INTERNAL MEDICINE

## 2018-10-26 PROCEDURE — 93005 ELECTROCARDIOGRAM TRACING: CPT | Performed by: NURSE PRACTITIONER

## 2018-10-26 PROCEDURE — 99203 OFFICE O/P NEW LOW 30 MIN: CPT | Performed by: NURSE PRACTITIONER

## 2018-10-26 PROCEDURE — 0296T HC EXT ECG > 48HR TO 21 DAY RCRD W/CONECT INTL RCRD: CPT

## 2018-10-26 NOTE — PROGRESS NOTES
Subjective:     Encounter Date:10/26/2018      Patient ID: Jadon Trujillo III is a 68 y.o. male.    Chief Complaint: Arrhythmia assessment    History of Present Illness:  Mr. Trujillo was recently hospitalized 10/19-10/20 for mental status changes.  MRI did not reveal CVA, but he does note hx of palpitations.  Dr. Solano has recommended he have an extended holter to assess for possible dysrhythmia as an explanation for his recent symptoms.      Since hospital discharge, he reports no further feelings of dizziness or amnesia.     Past Medical History:   Diagnosis Date   • Hypertension    • Non-Hodgkin lymphoma (CMS/HCC)    • Testicular cancer (CMS/HCC)        Past Surgical History:   Procedure Laterality Date   • EXPLORATORY LAPAROTOMY     • HERNIA REPAIR     • KNEE SURGERY     • ORCHIECTOMY     • TESTICLE SURGERY     • TONSILLECTOMY         Social History     Social History   • Marital status: Single     Spouse name: N/A   • Number of children: N/A   • Years of education: N/A     Occupational History   • Realtor      Social History Main Topics   • Smoking status: Former Smoker     Packs/day: 1.00     Years: 5.00     Types: Cigarettes     Quit date: 1995   • Smokeless tobacco: Never Used   • Alcohol use Yes      Comment: 1-2 glasses of wine daily, last 1 week ago   • Drug use: Unknown     Types: Marijuana      Comment: occasionally    • Sexual activity: Defer     Other Topics Concern   • Not on file     Social History Narrative    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     Caffeine Intake: 6-8 servings per day        Patient states that he is [place], and he  does not have a partner at this time           Family History   Problem Relation Age of Onset   • Hyperlipidemia Mother    • Hypertension Mother    • Prostate cancer Father    • Obesity Father    • No Known Problems Sister    • No Known Problems Maternal Grandmother    • No Known Problems Maternal Grandfather    • No Known Problems Paternal Grandmother    • No  "Known Problems Paternal Grandfather        Review of Systems   Constitution: Negative for chills, decreased appetite, diaphoresis, fever, weakness, malaise/fatigue, night sweats, weight gain and weight loss.   HENT: Negative for congestion, hearing loss, hoarse voice and nosebleeds.    Eyes: Negative for blurred vision, visual disturbance and visual halos.   Cardiovascular: Positive for chest pain (chest discomfort). Negative for claudication, cyanosis, dyspnea on exertion, irregular heartbeat, leg swelling, near-syncope, orthopnea, palpitations, paroxysmal nocturnal dyspnea and syncope.   Respiratory: Negative for cough, hemoptysis, shortness of breath, sleep disturbances due to breathing, snoring, sputum production and wheezing.    Hematologic/Lymphatic: Negative for bleeding problem. Does not bruise/bleed easily.   Skin: Negative for dry skin, itching and rash.   Musculoskeletal: Negative for arthritis, joint pain, joint swelling and myalgias.   Gastrointestinal: Negative for bloating, abdominal pain, constipation, diarrhea, flatus, heartburn, hematemesis, hematochezia, melena, nausea and vomiting.   Genitourinary: Negative for dysuria, frequency, hematuria, nocturia and urgency.   Neurological: Negative for excessive daytime sleepiness, dizziness, headaches, light-headedness and loss of balance.   Psychiatric/Behavioral: Negative for depression. The patient does not have insomnia and is not nervous/anxious.      Vitals:    10/26/18 1200   BP: 134/74   BP Location: Right arm   Patient Position: Sitting   Cuff Size: Adult   Pulse: 58   Resp: 16   Temp: 97.2 °F (36.2 °C)   TempSrc: Temporal Artery    SpO2: 97%   Weight: 91.6 kg (202 lb)   Height: 182.9 cm (72.01\")       Objective:     Physical Exam   Constitutional: He is oriented to person, place, and time. He appears well-developed and well-nourished. No distress.   HENT:   Head: Normocephalic and atraumatic.   Eyes: Pupils are equal, round, and reactive to " light. Conjunctivae are normal. No scleral icterus.   Neck: Normal range of motion. Neck supple.   Cardiovascular: Normal rate, regular rhythm, normal heart sounds and intact distal pulses.  Exam reveals no gallop and no friction rub.    No murmur heard.  Pulmonary/Chest: Effort normal and breath sounds normal. No respiratory distress. He has no wheezes. He has no rales.   Musculoskeletal: Normal range of motion. He exhibits no edema.   Neurological: He is alert and oriented to person, place, and time.   Skin: Skin is warm and dry.   Vitals reviewed.      Lab Review: Hospital notes, labs, imaging studies     Assessment/ Plan:          Diagnosis Plan   1. Transient global amnesia with dizziness - Episodic symptoms resolved  - Tx with ASA, statin, BP control  - Holter Monitor 14 days to assess for arrhythmias  - RTC in 3 weeks to review   2. Essential hypertension  - Controlled.  Compliant with ACE

## 2018-11-14 ENCOUNTER — HOSPITAL ENCOUNTER (OUTPATIENT)
Dept: MRI IMAGING | Facility: HOSPITAL | Age: 68
Discharge: HOME | End: 2018-11-14
Attending: INTERNAL MEDICINE

## 2018-11-14 ENCOUNTER — HOSPITAL ENCOUNTER (OUTPATIENT)
Dept: NEUROLOGY | Facility: HOSPITAL | Age: 68
Discharge: HOME | End: 2018-11-14
Attending: INTERNAL MEDICINE | Admitting: INTERNAL MEDICINE

## 2018-11-14 DIAGNOSIS — G45.4 TRANSIENT GLOBAL AMNESIA: ICD-10-CM

## 2018-11-14 DIAGNOSIS — G45.9 TIA (TRANSIENT ISCHEMIC ATTACK): ICD-10-CM

## 2018-11-14 PROCEDURE — 95816 EEG AWAKE AND DROWSY: CPT

## 2018-11-14 PROCEDURE — 70547 MR ANGIOGRAPHY NECK W/O DYE: CPT

## 2018-11-14 PROCEDURE — 70551 MRI BRAIN STEM W/O DYE: CPT

## 2018-11-15 PROCEDURE — 0298T PR EXT ECG > 48HR TO 21 DAY REVIEW AND INTERPRETATN: CPT | Performed by: INTERNAL MEDICINE

## 2018-11-19 ENCOUNTER — OFFICE VISIT (OUTPATIENT)
Dept: CARDIOLOGY | Facility: HOSPITAL | Age: 68
End: 2018-11-19

## 2018-11-19 VITALS
RESPIRATION RATE: 18 BRPM | OXYGEN SATURATION: 98 % | HEIGHT: 72 IN | TEMPERATURE: 98.1 F | BODY MASS INDEX: 27.22 KG/M2 | DIASTOLIC BLOOD PRESSURE: 88 MMHG | HEART RATE: 64 BPM | SYSTOLIC BLOOD PRESSURE: 124 MMHG | WEIGHT: 201 LBS

## 2018-11-19 DIAGNOSIS — G45.4 TRANSIENT GLOBAL AMNESIA: ICD-10-CM

## 2018-11-19 DIAGNOSIS — I10 ESSENTIAL HYPERTENSION: Primary | ICD-10-CM

## 2018-11-19 PROCEDURE — 99212 OFFICE O/P EST SF 10 MIN: CPT | Performed by: NURSE PRACTITIONER

## 2018-11-19 NOTE — PROGRESS NOTES
Subjective:     Encounter Date:2018      Patient ID: Jadon Trujillo III is a 68 y.o. male.    Chief Complaint: Zio follow up    History of Present Illness:  Recent hospitalization for mental status changes.  PCP, Dr. Solano, requested extended holter to further evaluate for dysrhythmia.  Patient states that he experienced two syncopal episodes after the monitor was returned.  He has since stopped the daily intake of lisinopril (only takes 1/2 dose if SBP >140).  He has also resumed fluid intake similar to what he traditionally took in prior to his hospitalization.      Patient is scheduled to see Dr. Solano this afternoon.  He bring in a list of BP/pulse numbers.    Past Medical History:   Diagnosis Date   • Hypertension    • Non-Hodgkin lymphoma (CMS/HCC)    • Testicular cancer (CMS/HCC)        Past Surgical History:   Procedure Laterality Date   • EXPLORATORY LAPAROTOMY     • HERNIA REPAIR     • KNEE SURGERY     • ORCHIECTOMY     • TESTICLE SURGERY     • TONSILLECTOMY         Social History     Socioeconomic History   • Marital status: Single     Spouse name: Not on file   • Number of children: Not on file   • Years of education: Not on file   • Highest education level: Not on file   Social Needs   • Financial resource strain: Not on file   • Food insecurity - worry: Not on file   • Food insecurity - inability: Not on file   • Transportation needs - medical: Not on file   • Transportation needs - non-medical: Not on file   Occupational History   • Occupation: Realtor   Tobacco Use   • Smoking status: Former Smoker     Packs/day: 1.00     Years: 5.00     Pack years: 5.00     Types: Cigarettes     Last attempt to quit:      Years since quittin.8   • Smokeless tobacco: Never Used   Substance and Sexual Activity   • Alcohol use: Yes     Comment: 1-2 glasses of wine daily, last 1 week ago   • Drug use: Unknown     Types: Marijuana     Comment: occasionally    • Sexual activity: Defer     Partners: Male    Other Topics Concern   • Not on file   Social History Narrative    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     Caffeine Intake: 6-8 servings per day        Patient states that he is [place], and he  does not have a partner at this time       Family History   Problem Relation Age of Onset   • Hyperlipidemia Mother    • Hypertension Mother    • Prostate cancer Father    • Obesity Father    • No Known Problems Sister    • No Known Problems Maternal Grandmother    • No Known Problems Maternal Grandfather    • No Known Problems Paternal Grandmother    • No Known Problems Paternal Grandfather        Review of Systems   Constitution: Positive for malaise/fatigue. Negative for chills, decreased appetite, diaphoresis, fever, weakness, night sweats, weight gain and weight loss.   HENT: Negative for congestion, hearing loss, hoarse voice and nosebleeds.    Eyes: Negative for blurred vision, visual disturbance and visual halos.   Cardiovascular: Positive for irregular heartbeat, near-syncope and syncope. Negative for chest pain, claudication, cyanosis, dyspnea on exertion, leg swelling, orthopnea, palpitations and paroxysmal nocturnal dyspnea.   Respiratory: Negative for cough, hemoptysis, shortness of breath, sleep disturbances due to breathing, snoring, sputum production and wheezing.    Hematologic/Lymphatic: Negative for bleeding problem. Does not bruise/bleed easily.   Skin: Negative for dry skin, itching and rash.   Musculoskeletal: Negative for arthritis, joint pain, joint swelling and myalgias.   Gastrointestinal: Negative for bloating, abdominal pain, constipation, diarrhea, flatus, heartburn, hematemesis, hematochezia, melena, nausea and vomiting.   Genitourinary: Negative for dysuria, frequency, hematuria, nocturia and urgency.   Neurological: Positive for dizziness and light-headedness. Negative for excessive daytime sleepiness, headaches and loss of balance.   Psychiatric/Behavioral: Negative for depression. The  "patient does not have insomnia and is not nervous/anxious.      Vitals:    11/19/18 1016 11/19/18 1017 11/19/18 1018   BP: 142/88 142/84 124/88   BP Location: Right arm Left arm Left arm   Patient Position: Sitting Sitting Standing   Cuff Size: Adult     Pulse: 59 58 64   Resp: 18     Temp: 98.1 °F (36.7 °C)     TempSrc: Temporal     SpO2: 98%     Weight: 91.2 kg (201 lb)     Height: 182.9 cm (72.01\")         Objective:     Physical Exam   Constitutional: He is oriented to person, place, and time. He appears well-developed and well-nourished. No distress.   HENT:   Head: Normocephalic and atraumatic.   Eyes: Pupils are equal, round, and reactive to light.   Neck: Neck supple. No JVD present.   Pulmonary/Chest: Effort normal.   Musculoskeletal: Normal range of motion. He exhibits no edema.   Neurological: He is alert and oriented to person, place, and time. No cranial nerve deficit.   Skin: Skin is warm and dry.   Psychiatric: He has a normal mood and affect. His behavior is normal. Thought content normal.   Vitals reviewed.      Lab Review: NA     Assessment/ Plan:          Diagnosis Plan   1. Essential hypertension  No further syncope since anti-hypertensive meds reduced and BP monitored closely for PRN administration.  Recommended he continue weight loss efforts and regular exercise.     2. Transient global amnesia  Extended holter revealed longest tachycardia run of 16 beats.  Overall ventricular ectopic burden was <1%.  No pauses.  No atrial fibrillation noted.  No evidence of dysrhythmia which could explain mental status change or syncope.  Further recommendations per Dr. Solano.             "

## 2018-12-31 ENCOUNTER — TRANSCRIBE ORDERS (OUTPATIENT)
Dept: ADMINISTRATIVE | Facility: HOSPITAL | Age: 68
End: 2018-12-31

## 2018-12-31 DIAGNOSIS — R07.2 RETROSTERNAL CHEST PAIN: ICD-10-CM

## 2018-12-31 DIAGNOSIS — E78.00 HIGH CHOLESTEROL: Primary | ICD-10-CM

## 2019-01-18 ENCOUNTER — APPOINTMENT (OUTPATIENT)
Dept: CARDIOLOGY | Facility: HOSPITAL | Age: 69
End: 2019-01-18
Attending: INTERNAL MEDICINE

## 2019-02-01 ENCOUNTER — TRANSCRIBE ORDERS (OUTPATIENT)
Dept: ADMINISTRATIVE | Facility: HOSPITAL | Age: 69
End: 2019-02-01

## 2019-02-01 DIAGNOSIS — G45.4 TRANSIENT GLOBAL AMNESIA: Primary | ICD-10-CM

## 2019-02-06 ENCOUNTER — TRANSCRIBE ORDERS (OUTPATIENT)
Dept: ADMINISTRATIVE | Facility: HOSPITAL | Age: 69
End: 2019-02-06

## 2019-02-06 DIAGNOSIS — G45.9 TIA (TRANSIENT ISCHEMIC ATTACK): Primary | ICD-10-CM

## 2019-02-06 DIAGNOSIS — G45.4 TRANSIENT GLOBAL AMNESIA: Primary | ICD-10-CM

## 2019-02-07 ENCOUNTER — HOSPITAL ENCOUNTER (OUTPATIENT)
Dept: MRI IMAGING | Facility: HOSPITAL | Age: 69
Discharge: HOME | End: 2019-02-07
Admitting: INTERNAL MEDICINE

## 2019-02-07 ENCOUNTER — APPOINTMENT (OUTPATIENT)
Dept: MRI IMAGING | Facility: HOSPITAL | Age: 69
End: 2019-02-07
Attending: INTERNAL MEDICINE

## 2019-02-07 DIAGNOSIS — G45.9 TIA (TRANSIENT ISCHEMIC ATTACK): ICD-10-CM

## 2019-02-07 PROCEDURE — 70551 MRI BRAIN STEM W/O DYE: CPT

## 2019-02-12 ENCOUNTER — HOSPITAL ENCOUNTER (OUTPATIENT)
Dept: MRI IMAGING | Facility: HOSPITAL | Age: 69
Discharge: HOME | End: 2019-02-12
Admitting: INTERNAL MEDICINE

## 2019-02-12 DIAGNOSIS — G45.9 TIA (TRANSIENT ISCHEMIC ATTACK): ICD-10-CM

## 2019-02-12 LAB — CREAT BLDA-MCNC: 0.9 MG/DL (ref 0.6–1.3)

## 2019-02-12 PROCEDURE — A9577 INJ MULTIHANCE: HCPCS | Performed by: INTERNAL MEDICINE

## 2019-02-12 PROCEDURE — 0 GADOBENATE DIMEGLUMINE 529 MG/ML SOLUTION: Performed by: INTERNAL MEDICINE

## 2019-02-12 PROCEDURE — 82565 ASSAY OF CREATININE: CPT

## 2019-02-12 PROCEDURE — 70545 MR ANGIOGRAPHY HEAD W/DYE: CPT

## 2019-02-12 RX ADMIN — GADOBENATE DIMEGLUMINE 15 ML: 529 INJECTION, SOLUTION INTRAVENOUS at 09:07

## 2019-02-15 ENCOUNTER — TRANSCRIBE ORDERS (OUTPATIENT)
Dept: ADMINISTRATIVE | Facility: HOSPITAL | Age: 69
End: 2019-02-15

## 2019-02-15 DIAGNOSIS — R94.6 THYROID FUNCTION TEST ABNORMAL: Primary | ICD-10-CM

## 2019-02-21 ENCOUNTER — HOSPITAL ENCOUNTER (OUTPATIENT)
Dept: NEUROLOGY | Facility: HOSPITAL | Age: 69
Discharge: HOME | End: 2019-02-21
Admitting: INTERNAL MEDICINE

## 2019-02-21 DIAGNOSIS — G45.4 TRANSIENT GLOBAL AMNESIA: ICD-10-CM

## 2019-02-21 PROCEDURE — 95816 EEG AWAKE AND DROWSY: CPT

## 2019-03-04 ENCOUNTER — HOSPITAL ENCOUNTER (OUTPATIENT)
Dept: NUCLEAR MEDICINE | Facility: HOSPITAL | Age: 69
Discharge: HOME | End: 2019-03-04

## 2019-03-04 DIAGNOSIS — R94.6 THYROID FUNCTION TEST ABNORMAL: ICD-10-CM

## 2019-03-04 PROCEDURE — 0 SODIUM IODIDE 3.7 MBQ CAPSULE: Performed by: INTERNAL MEDICINE

## 2019-03-04 PROCEDURE — A9516 IODINE I-123 SOD IODIDE MIC: HCPCS | Performed by: INTERNAL MEDICINE

## 2019-03-04 PROCEDURE — 78014 THYROID IMAGING W/BLOOD FLOW: CPT

## 2019-03-04 RX ORDER — SODIUM IODIDE I 123 100 UCI/1
1 CAPSULE, GELATIN COATED ORAL
Status: COMPLETED | OUTPATIENT
Start: 2019-03-04 | End: 2019-03-04

## 2019-03-04 RX ADMIN — Medication 1 CAPSULE: at 09:10

## 2019-03-05 ENCOUNTER — HOSPITAL ENCOUNTER (OUTPATIENT)
Dept: NUCLEAR MEDICINE | Facility: HOSPITAL | Age: 69
Discharge: HOME | End: 2019-03-05

## 2019-03-14 ENCOUNTER — LAB (OUTPATIENT)
Dept: LAB | Facility: HOSPITAL | Age: 69
End: 2019-03-14

## 2019-03-14 ENCOUNTER — OFFICE VISIT (OUTPATIENT)
Dept: NEUROLOGY | Facility: CLINIC | Age: 69
End: 2019-03-14

## 2019-03-14 VITALS
DIASTOLIC BLOOD PRESSURE: 78 MMHG | WEIGHT: 185 LBS | BODY MASS INDEX: 25.06 KG/M2 | SYSTOLIC BLOOD PRESSURE: 120 MMHG | HEIGHT: 72 IN

## 2019-03-14 DIAGNOSIS — R41.3 AMNESTIC DISORDER: ICD-10-CM

## 2019-03-14 DIAGNOSIS — R53.83 OTHER FATIGUE: ICD-10-CM

## 2019-03-14 DIAGNOSIS — R41.3 AMNESTIC DISORDER: Primary | ICD-10-CM

## 2019-03-14 DIAGNOSIS — R41.3 MEMORY LOSS: ICD-10-CM

## 2019-03-14 PROCEDURE — 36415 COLL VENOUS BLD VENIPUNCTURE: CPT

## 2019-03-14 PROCEDURE — 99215 OFFICE O/P EST HI 40 MIN: CPT | Performed by: NURSE PRACTITIONER

## 2019-03-14 PROCEDURE — 86701 HIV-1ANTIBODY: CPT

## 2019-03-14 PROCEDURE — 86702 HIV-2 ANTIBODY: CPT

## 2019-03-14 PROCEDURE — 83921 ORGANIC ACID SINGLE QUANT: CPT

## 2019-03-14 RX ORDER — LISINOPRIL 2.5 MG/1
2.5 TABLET ORAL DAILY
COMMUNITY

## 2019-03-14 RX ORDER — ASPIRIN 325 MG
TABLET ORAL EVERY 12 HOURS SCHEDULED
COMMUNITY

## 2019-03-14 RX ORDER — ATORVASTATIN CALCIUM 10 MG/1
10 TABLET, FILM COATED ORAL DAILY
COMMUNITY

## 2019-03-14 NOTE — PROGRESS NOTES
"Subjective:     Patient ID: Jadon Trujillo III is a 68 y.o. male.    CC:   Chief Complaint   Patient presents with   • GLOBAL AMNESIA       HPI:   History of Present Illness     Mr Trujillo is a very pleasant gentleman here today for initial neurological evaluation with complaints of \"global amnesia\".  He tells me that he has always had problems with short-term memory loss since the 1980s after having several types of chemotherapy for testicular cancer as well as non-Hodgkin's lymphoma.  He reports that he has had \"chemo brain\" since that time.  He tells me that he is also always had problems remembering things in which he deems insignificant or unimportant in his life.  He has always been a list maker, relying on list to remind him to do certain things.  He works in a fairly successful real estate business.  He tells me that back in October his  came to his home and reported that he was having trouble with his speech, he was essentially aphasic.  He was sent to the emergency room and admitted for evaluation.  At that time he was diagnosed with global amnesia, symptoms were thought to be related to hypertensive emergency at that time.  During that time MRI of the brain without contrast was performed showing no acute abnormalities with a very minimal white matter changes thought to be related to chronic small vessel disease.  Carotid artery ultrasound also performed during hospitalization showed 0-49% stenosis bilaterally.  He did have a known diagnosis of hyperlipidemia at that time with an LDL of 172.  He tells me that after establishing care with a primary care provider he was started on lisinopril after discharge.  He had problems with severe hypotension with lisinopril so he is now taking lisinopril 2.5 mg as needed.  He has been very hesitant to use conventional prescription medications as he likes more holistic approaches to medicine.  Second MRI was ordered by primary care in November " "showing no abnormalities, MR a of the neck performed at that time as well read as normal.  He did have a stress test performed in December, he is unsure of these results however was told he had mild plaque disease.  PCP again ordered an MRI of the brain with and without contrast in February, apparently insurance denied contrasted scan so repeat MRI without contrast was ordered after another amnestic event in February.  He tells me that he was under quite a bit of stress at that time as he was closing on a home of her former domestic partner who had lost his medical license, become homeless and was unfortunately struggling with drugs.  He reports that during that time when he found his prior partner was into trouble he became stressed, he went home and \"went to bed\" waking up the next morning with no recollection of the prior day.  MRI performed at that time also read as minimal chronic changes within the brain with mild atrophy.  MRA of the head also performed showing about a 30-40% distal right vertebral artery stenosis with no other abnormalities.  He tells me that he has been started on the Vascepa and he has lost 30 pounds in the last 6 months intentionally with diet changes.  He feels certain that these dietary measures will control his hyperlipidemia.  He tells me today that his  has been quite concerned with the recent spells he has had with his memory and amnestic events.  He however feels certain that these were isolated incidents.  He has also had 2 separate EEGs performed outpatient which were both read as unremarkable.  In conversation with patient I was able to get further information about his spells, he does tell me that prior to 2 of the amnestic events he felt he smelled a very pleasant odor which he thought was odd.  No family history of seizures, no history of seizures in the past.  Other labs that have been recently performed by PCP include hemoglobin A1c of 5.9%, B12 level of " 337, folate level 13.55, ammonia level normal at 27, normal sed rate and CRP.  Again LDL was noted to be 172.    He tells me that he feels he is doing fine, he has no problems paying bills, he has had no significant events that were worrisome in regards to memory outside of the above-noted incidents.  He was in a previous same-sex partnership, he has been celibate since , last HIV test .      The following portions of the patient's history were reviewed and updated as appropriate: allergies, current medications, past family history, past medical history, past social history, past surgical history and problem list.    Past Medical History:   Diagnosis Date   • Hypertension    • Non-Hodgkin lymphoma (CMS/HCC)    • Testicular cancer (CMS/HCC)        Past Surgical History:   Procedure Laterality Date   • EXPLORATORY LAPAROTOMY     • HERNIA REPAIR     • KNEE SURGERY     • ORCHIECTOMY     • TESTICLE SURGERY     • TONSILLECTOMY         Social History     Socioeconomic History   • Marital status: Single     Spouse name: Not on file   • Number of children: Not on file   • Years of education: Not on file   • Highest education level: Not on file   Social Needs   • Financial resource strain: Not on file   • Food insecurity - worry: Not on file   • Food insecurity - inability: Not on file   • Transportation needs - medical: Not on file   • Transportation needs - non-medical: Not on file   Occupational History   • Occupation: Realtor   Tobacco Use   • Smoking status: Former Smoker     Packs/day: 1.00     Years: 5.00     Pack years: 5.00     Types: Cigarettes     Last attempt to quit:      Years since quittin.2   • Smokeless tobacco: Never Used   Substance and Sexual Activity   • Alcohol use: Yes     Comment: 1-2 glasses of wine daily, last 1 week ago   • Drug use: Unknown     Types: Marijuana     Comment: occasionally    • Sexual activity: Defer     Partners: Male   Other Topics Concern   • Not on file   Social  History Narrative    Alyssia who lives alone in [place].     Caffeine Intake: 6-8 servings per day        Patient states that he is [place], and he  does not have a partner at this time       Family History   Problem Relation Age of Onset   • Hyperlipidemia Mother    • Hypertension Mother    • Prostate cancer Father    • Obesity Father    • No Known Problems Sister    • No Known Problems Maternal Grandmother    • No Known Problems Maternal Grandfather    • No Known Problems Paternal Grandmother    • No Known Problems Paternal Grandfather         Review of Systems   Constitutional: Negative.    HENT: Negative.    Eyes: Negative.    Respiratory: Negative.    Cardiovascular: Negative.    Gastrointestinal: Negative.    Endocrine: Negative.    Genitourinary: Negative.    Musculoskeletal: Negative.    Skin: Negative.    Allergic/Immunologic: Negative.    Neurological: Negative.    Hematological: Negative.    Psychiatric/Behavioral: Negative.         Objective:    Neurologic Exam     Mental Status   Oriented to person, place, and time.   Attention: normal. Concentration: normal.   Speech: speech is normal   Level of consciousness: alert  Knowledge: consistent with education.   Able to read. Able to write. Normal comprehension.   MMSE score 24/30 however pt did not attempt attention/calculation as he has reported dyslexia and is unable to spell or count backwards.  2/3 short term recall  He is a very pleasant and articulate gentleman who does seem a bit hyperactive with slight pressured speech.     Cranial Nerves   Cranial nerves II through XII intact.     CN II   Visual fields full to confrontation.   Right visual field deficit: none  Left visual field deficit: none     CN III, IV, VI   Pupils are equal, round, and reactive to light.  Extraocular motions are normal.   Right pupil: Shape: regular. Reactivity: brisk. Consensual response: intact. Accommodation: intact.   Left pupil: Shape: regular. Reactivity: brisk. Consensual  response: intact. Accommodation: intact.   CN III: no CN III palsy  CN VI: no CN VI palsy  Nystagmus: none   Upgaze: normal  Downgaze: normal    CN V   Facial sensation intact.     CN VII   Facial expression full, symmetric.     CN VIII   CN VIII normal.     CN IX, X   CN IX normal.   CN X normal.     CN XI   CN XI normal.     CN XII   CN XII normal.     Motor Exam   Muscle bulk: normal  Overall muscle tone: normal  Right arm tone: normal  Left arm tone: normal  Right arm pronator drift: absent  Left arm pronator drift: absent  Right leg tone: normal  Left leg tone: normal    Strength   Strength 5/5 throughout.     Sensory Exam   Light touch normal.     Gait, Coordination, and Reflexes     Gait  Gait: normal    Coordination   Romberg: negative  Finger to nose coordination: normal  Heel to shin coordination: normal  Tandem walking coordination: normal    Tremor   Resting tremor: absent  Intention tremor: absent  Action tremor: absent    Reflexes   Reflexes 2+ except as noted.   Right plantar: normal  Left plantar: normal  Right Stuart: absent  Left Stuart: absent      Physical Exam   Constitutional: He is oriented to person, place, and time. He appears well-developed and well-nourished. No distress.   HENT:   Head: Normocephalic and atraumatic.   Eyes: Conjunctivae and EOM are normal. Pupils are equal, round, and reactive to light. No scleral icterus.   Neck: Normal range of motion. Neck supple.   Pulmonary/Chest: Effort normal. No respiratory distress.   Neurological: He is alert and oriented to person, place, and time. He has normal strength. He has a normal Finger-Nose-Finger Test, a normal Heel to Shin Test, a normal Romberg Test and a normal Tandem Gait Test. Gait normal.   Skin: Skin is warm.   Psychiatric: He has a normal mood and affect. His speech is normal and behavior is normal. Judgment and thought content normal.   Vitals reviewed.      Assessment/Plan:       Jadon was seen today for global  amnesia.    Diagnoses and all orders for this visit:    Amnestic disorder  -     HIV 1 / 2 (HIV-1 / 2) Antibody Differentiation; Future  -     Methylmalonic Acid, Serum; Future  -     Ambulatory Referral to Neurology    Memory loss  -     HIV 1 / 2 (HIV-1 / 2) Antibody Differentiation; Future    Other fatigue   -     HIV 1 / 2 (HIV-1 / 2) Antibody Differentiation; Future    Mr Trujillo seems quite convinced that each episode he has had since October have been related to stress and/or blood pressure.  I am concerned however that he reports olfactory aura prior to each episode, he has had 2 unremarkable outpatient EEGs.  I had a discussion with him about possible causes for his symptoms and he is agreeable to seeing Dr. Claudio for possible prolonged EEG monitoring.  I did offer to trial him on Keppra which he has declined.    There is also a possibility that stress is playing a role in this as he does seem to be a bit hyperactive with some pressured speech, he is a very high energy person which he admits to.  He has had 3 unremarkable MRIs of the brain since October, each of these have been without contrast.  Given his past history of lymphoma and testicular cancer I have mentioned to him I would feel best with getting a contrasted study however he would like to wait on this at this time.  He would also like to wait on referral for evaluation of vertebral artery stenosis of 40%.  He had had  Quite extenstive lab work, I have ordered an HIV methylmalonic acid to be drawn today, we will contact him with these results when they are received.  I would like to see him back in about 2 months or sooner prn.  Reviewed medications, potential side effects and signs and symptoms to report. Discussed risk versus benefits of treatment plan with patient and/or family-including medications, labs and radiology that may be ordered. Addressed questions and concerns during visit. Patient and/or family verbalized understanding and  agree with plan.  Discussed signs and symptoms of stroke and when to call 911. Instructed to follow a low fat diet including the Mediterranean diet. Instructed to take all medications daily as prescribed. Encouraged 30 minutes of exercise 3-4 times a week as tolerated. Stay well hydrated. Discussed potential side effects of new medications and signs and symptoms to report. If patient is currently using tobacco products, smoking cessation was encouraged. Reviewed stroke risk factors and stroke prevention plan. Patient and/or family verbalizes understanding and agrees with plan.   During this visit the following were done:  Labs Reviewed [x]    Labs Ordered [x]    Radiology Reports Reviewed [x]    Radiology Ordered []    PCP Records Reviewed []    Referring Provider Records Reviewed [x]    ER Records Reviewed []    Hospital Records Reviewed [x]  All records personally reviewed  History Obtained From Family []    Radiology Images Reviewed []    Other Reviewed []    Records Requested []    Total time of visit face-to-face 40 minutes with greater than 30 minutes spent in discussion and counseling results, plan of care and upcoming testing  EMR Dragon/Transcription Disclaimer:  Much of this encounter note is an electronic transcription of spoken language to printed text. Electronic transcription of spoken language may permit erroneous words or phrases to be inadvertently transcribed. Although I have reviewed the note for such errors, some may still exist in this documentation.             Mora Oliva, APRN  3/14/2019

## 2019-03-15 LAB
HIV 1 & 2 AB SERPLBLD IA.RAPID: NEGATIVE
HIV 2 AB SERPLBLD QL IA.RAPID: NEGATIVE
HIV1 AB SERPLBLD QL IA.RAPID: NEGATIVE

## 2019-03-17 LAB
Lab: NORMAL
METHYLMALONATE SERPL-SCNC: 159 NMOL/L (ref 0–378)

## 2019-03-19 ENCOUNTER — TELEPHONE (OUTPATIENT)
Dept: NEUROLOGY | Facility: CLINIC | Age: 69
End: 2019-03-19

## 2019-03-19 NOTE — TELEPHONE ENCOUNTER
Pt was thinking about the most recent episode he had.  He had a visual disturbance with a smell.  He states that you can discuss further at his f/u.  If anything changes he will call back.

## 2019-03-19 NOTE — TELEPHONE ENCOUNTER
----- Message from PRATIMA Butcher sent at 3/18/2019  6:31 PM EDT -----  Let pt know HIV negative and b12 appears stable

## 2019-03-26 NOTE — TELEPHONE ENCOUNTER
I spoke with the patient again today.  He hasn't been contacted by the office yet.  He took the number and plans to touch base with them if he doesn't hear soon.

## 2019-08-13 ENCOUNTER — HOSPITAL ENCOUNTER (OUTPATIENT)
Dept: GENERAL RADIOLOGY | Facility: HOSPITAL | Age: 69
Discharge: HOME | End: 2019-08-13
Admitting: INTERNAL MEDICINE

## 2019-08-13 ENCOUNTER — TRANSCRIBE ORDERS (OUTPATIENT)
Dept: ADMINISTRATIVE | Facility: HOSPITAL | Age: 69
End: 2019-08-13

## 2019-08-13 DIAGNOSIS — S42.302A: Primary | ICD-10-CM

## 2019-08-13 DIAGNOSIS — S22.20XA: Primary | ICD-10-CM

## 2019-08-13 DIAGNOSIS — S22.49XA: Primary | ICD-10-CM

## 2019-08-13 DIAGNOSIS — S42.301A: Primary | ICD-10-CM

## 2019-08-13 PROCEDURE — 71046 X-RAY EXAM CHEST 2 VIEWS: CPT

## 2024-12-08 NOTE — PLAN OF CARE
"Problem: Sensory/Perceptual Alteration (Adult)  Goal: Identify Related Risk Factors and Signs and Symptoms  Outcome: Ongoing (interventions implemented as appropriate)    Goal: Functional Ability/Safety  Outcome: Ongoing (interventions implemented as appropriate)    Goal: Balanced Response to Sensory Input  Outcome: Ongoing (interventions implemented as appropriate)      Problem: Patient Care Overview  Goal: Plan of Care Review  Outcome: Ongoing (interventions implemented as appropriate)   10/20/18 0656   Coping/Psychosocial   Plan of Care Reviewed With patient   Plan of Care Review   Progress improving   OTHER   Outcome Summary pt A&Ox4, Resp WNL, Cardiac WNL. pt did have episode of forgetfulness and aphasia which lasted about 10 minutes. the symptoms resolved quickly. no further symptoms throughout the night. pt ambulated independently in room without difficutly. gave PRN Tylenol for elevated temperature. pt stated he noticed when he had these \"episodes\" his sense of smells becomes \"more intense.\" VSS. no visitors this shift.     Goal: Individualization and Mutuality  Outcome: Ongoing (interventions implemented as appropriate)    Goal: Discharge Needs Assessment  Outcome: Ongoing (interventions implemented as appropriate)    Goal: Interprofessional Rounds/Family Conf  Outcome: Ongoing (interventions implemented as appropriate)        "
Problem: Sensory/Perceptual Alteration (Adult)  Goal: Identify Related Risk Factors and Signs and Symptoms  Outcome: Ongoing (interventions implemented as appropriate)        
no chest pain, no cough, and no shortness of breath.